# Patient Record
(demographics unavailable — no encounter records)

---

## 2024-10-22 NOTE — PHYSICAL EXAM
[de-identified] : well-developed male, NAD [de-identified] : NC/AT [de-identified] : Face - nasal bridge with 8mm raised tan sin lesion c/w nevus sebaceous in the background of 1.5 cm pigmented macular skin lesion, nontender

## 2024-10-22 NOTE — HISTORY OF PRESENT ILLNESS
[FreeTextEntry1] : 11 yo otherwise healthy M accompanied by his parents who presents for evaluation of skin lesion on the nasal bridge present for the past 4-5 years increasing in size and changing characteristics. No pain, drainage or pruritus. Denies any family h/o skin cancer.   middle school student

## 2024-10-22 NOTE — HISTORY OF PRESENT ILLNESS
[FreeTextEntry1] : 13 yo otherwise healthy M accompanied by his parents who presents for evaluation of skin lesion on the nasal bridge present for the past 4-5 years increasing in size and changing characteristics. No pain, drainage or pruritus. Denies any family h/o skin cancer.   middle school student

## 2024-10-22 NOTE — PHYSICAL EXAM
[de-identified] : well-developed male, NAD [de-identified] : NC/AT [de-identified] : Face - nasal bridge with 8mm raised tan sin lesion c/w nevus sebaceous in the background of 1.5 cm pigmented macular skin lesion, nontender

## 2024-10-22 NOTE — ASSESSMENT
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   as above lesion present since birth (?) as thin line recent increase in width  after consdiering various options suiggest serial excision likely best option BUT suggested derm evaluation prior contact info given for Dr Caraballo  Regarding the procedure, we discussed scarring, poor wound healing, bleeding, infection, need for additional surgery, and dissatisfaction with the outcome.  Also discussed possibility of keloid and/or hypertrophic scar formation as well as recurrence.  All questions were answered, and risks understood.

## 2024-11-22 NOTE — PROCEDURE
[FreeTextEntry6] : Patient is a 12 year old male with a nasal bridge skin lesion measuring approximately 1cm x 7 mm.    The area was prepped and draped in the usual fashion.  Local anesthetic was administered using 1% lidocaine with epinephrine.  Lesion was sharply excised.  Area was irrigated copiously.  Complex wound closure was performed in layers.  The wound measured approximately 2 cm.  Sterile dressing applied.    Patient tolerated procedure well and understands post-op instructions.  Sutures Used: 5-0 prolene, 5-0 monocryl

## 2024-12-02 NOTE — DATA REVIEWED
[FreeTextEntry1] : Patient:     LIANNA GARCIA   Accession:                             [id number]  Collected Date/Time:                   11/22/2024 14:19 EST Received Date/Time:                    11/25/2024 09:33 EST  Surgical Pathology Report - Auth (Verified)  Specimen(s) Submitted Nasal bridge skin lesion  Final Diagnosis Nasal bridge, skin lesion, excision: - Nevus sebaceous.  Verified by: Sharri Moreno M.D. (Electronic Signature) Reported on: 11/26/24 16:43 EST, [facility], [address] [place] [address] Phone: [phone]   Fax: [phone]

## 2024-12-02 NOTE — ASSESSMENT
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   - sutures removed - daily Aquaphor - no sports x 2 weeks - pathology discussed - nevus sebaceous, report given to pt - all their questions were answered - f/u 1 month for scar management and discussed of next serial excision

## 2024-12-02 NOTE — HISTORY OF PRESENT ILLNESS
[FreeTextEntry1] : 13 yo otherwise healthy M accompanied by his parents who presents for evaluation of skin lesion on the nasal bridge present for the past 4-5 years increasing in size and changing characteristics. No pain, drainage or pruritus. Denies any family h/o skin cancer.   middle school student  Interval hx (12/2/24). Pt here POD#10 s/p office excision of nasal bridge skin lesion. Doing well with no significant pain, f/c or drainage.

## 2024-12-02 NOTE — PHYSICAL EXAM
[de-identified] : well-developed male, NAD [de-identified] : NC/AT [de-identified] : Face - nasal bridge incision healing well, c/d/i, no erythema, residual nevus present

## 2025-01-02 NOTE — PHYSICAL EXAM
[de-identified] : well-developed male, NAD [de-identified] : NC/AT [de-identified] : Face - nasal bridge incision healing well, c/d/i, hypopigmentation present, residual nevus present

## 2025-01-02 NOTE — DATA REVIEWED
[FreeTextEntry1] : Patient:     LIANNA GARCIA   Accession:                             [id number]  Collected Date/Time:                   11/22/2024 14:19 EST Received Date/Time:                    11/25/2024 09:33 EST  Surgical Pathology Report - Auth (Verified)  Specimen(s) Submitted Nasal bridge skin lesion  Final Diagnosis Nasal bridge, skin lesion, excision: - Nevus sebaceous.  Verified by: Sharri Moreno M.D. (Electronic Signature) Reported on: 11/26/24 16:43 EST, [facility], [address] [place] [address] Phone: [phone]   Fax: [phone]

## 2025-01-02 NOTE — ASSESSMENT
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 Doing well.   - daily Aquaphor and scar massage as demonstrated  - pathology discussed - nevus sebaceous - all their questions were answered - f/u 3 months for next office serial excision with Dr. Wells.

## 2025-01-02 NOTE — HISTORY OF PRESENT ILLNESS
[FreeTextEntry1] : 13 yo otherwise healthy M accompanied by his parents who presents for evaluation of skin lesion on the nasal bridge present for the past 4-5 years increasing in size and changing characteristics. No pain, drainage or pruritus. Denies any family h/o skin cancer.   middle school student  Interval hx (12/2/24). Pt here POD#10 s/p office excision of nasal bridge skin lesion. Doing well with no significant pain, f/c or drainage.   Interval hx (1/2/25). Pt presents today accompanied by dad 6 weeks s/p office excision of nasal bridge nevus sebaceous. Doing well with no new issues. Denies any pain, f/c or drainage.